# Patient Record
(demographics unavailable — no encounter records)

---

## 2025-03-28 NOTE — HISTORY OF PRESENT ILLNESS
[de-identified] : Patient presents for followup -om prozac for anxiety and depression -GERD, on omeprazole -Hyperlipidemia, trying to improve dietarily -Struggling with weight, weight loss injectables not covered by insurance

## 2025-03-28 NOTE — ASSESSMENT
[FreeTextEntry1] : Venipuncture done in our office, Labs sent out.Patient advised to continue present medications with diet/exercise and specialists followup. Patient will return to the office for CP in 4months   mammogram 2/2024 "to do" Echo 3/2021 Specialists include 1. GYN=to see Dr. Wren in near future 2. Spine specialist- Dr. Garcia 3. Cardiology-Dr. Gasca 4.Pulmonary-Dr. Marquez 5. Bariatric-Dr. leger 6. GI-Dr. Morillo 7.therapist + vaccines are UTD, +got covid vaccines  Colonoscopy 1/2023 with follow-up in 10 years

## 2025-03-28 NOTE — HISTORY OF PRESENT ILLNESS
[de-identified] : Patient presents for followup -om prozac for anxiety and depression -GERD, on omeprazole -Hyperlipidemia, trying to improve dietarily -Struggling with weight, weight loss injectables not covered by insurance

## 2025-05-10 NOTE — HISTORY OF PRESENT ILLNESS
[de-identified] : Patient presents for followup -GERD, on omeprazole -Hyperlipidemia, trying to improve dietarily - On semaglutide for obesity, highest weight was 283 and now down to 247 -Currently not fasting -Doing well off of Prozac -Started HRT

## 2025-05-10 NOTE — HISTORY OF PRESENT ILLNESS
[de-identified] : Patient presents for followup -GERD, on omeprazole -Hyperlipidemia, trying to improve dietarily - On semaglutide for obesity, highest weight was 283 and now down to 247 -Currently not fasting -Doing well off of Prozac -Started HRT

## 2025-05-10 NOTE — ASSESSMENT
[FreeTextEntry1] : Venipuncture done in our office, Labs sent out.Patient advised to continue present medications with diet/exercise and specialists followup. Patient will return to the office for CP in 4months   mammogram 2/2024 "to do"= referral given Echo 3/2021 Specialists include 1. GYN= Dr. Wren 2. Spine specialist- Dr. Garcia 3. Cardiology-Dr. Gasca 4.Pulmonary-Dr. Marquez 5. Bariatric-Dr. leger 6. GI-Dr. Morillo 7.therapist + 8.[facility] for rx vaccines are UTD, +got covid vaccines  Colonoscopy 1/2023 with follow-up in 10 years

## 2025-05-10 NOTE — ADDENDUM
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

## 2025-05-10 NOTE — HISTORY OF PRESENT ILLNESS
[de-identified] : Patient presents for followup -GERD, on omeprazole -Hyperlipidemia, trying to improve dietarily - On semaglutide for obesity, highest weight was 283 and now down to 247 -Currently not fasting -Doing well off of Prozac -Started HRT

## 2025-06-17 NOTE — COUNSELING
[Potential consequences of obesity discussed] : Potential consequences of obesity discussed [Benefits of weight loss discussed] : Benefits of weight loss discussed [Structured Weight Management Program suggested:] : Structured weight management program suggested [Encouraged to increase physical activity] : Encouraged to increase physical activity [None] : None [Needs reinforcement, provided] : Patient needs reinforcement on understanding of lifestyle changes and steps needed to achieve self management goal; reinforcement was provided

## 2025-06-17 NOTE — ASSESSMENT
[Vaccines Reviewed] : Immunizations reviewed today. Please see immunization details in the vaccine log within the immunization flowsheet.  Oriented to time, place, person, situation

## 2025-06-18 NOTE — HEALTH RISK ASSESSMENT
[Good] : ~his/her~ current health as good [Never (0 pts)] : Never (0 points) [No] : In the past 12 months have you used drugs other than those required for medical reasons? No [No falls in past year] : Patient reported no falls in the past year [0] : 2) Feeling down, depressed, or hopeless: Not at all (0) [PHQ-2 Negative - No further assessment needed] : PHQ-2 Negative - No further assessment needed [Former] : Former [Patient reported mammogram was normal] : Patient reported mammogram was normal [Patient reported PAP Smear was normal] : Patient reported PAP Smear was normal [With Significant Other] : lives with significant other [# of Members in Household ___] :  household currently consist of [unfilled] member(s) [Employed] : employed [College] : College [] :  [# Of Children ___] : has [unfilled] children [Feels Safe at Home] : Feels safe at home [Fully functional (bathing, dressing, toileting, transferring, walking, feeding)] : Fully functional (bathing, dressing, toileting, transferring, walking, feeding) [Fully functional (using the telephone, shopping, preparing meals, housekeeping, doing laundry, using] : Fully functional and needs no help or supervision to perform IADLs (using the telephone, shopping, preparing meals, housekeeping, doing laundry, using transportation, managing medications and managing finances) [Smoke Detector] : smoke detector [Carbon Monoxide Detector] : carbon monoxide detector [Seat Belt] :  uses seat belt [Sunscreen] : uses sunscreen [Reviewed no changes] : Reviewed, no changes [Designated Healthcare Proxy] : Designated healthcare proxy [Name: ___] : Health Care Proxy's Name: [unfilled]  [Very Good] : ~his/her~  mood as very good [2 - 3 times a week (3 pts)] : 2 - 3  times a week (3 points) [3 or 4 (1 pt)] : 3 or 4  (1 point) [None] : Patient does not have any barriers to medication adherence [Yes] : Reviewed medication list for presence of high-risk medications. [NO] : No

## 2025-06-24 NOTE — HISTORY OF PRESENT ILLNESS
[Home] : at home, [unfilled] , at the time of the visit. [Medical Office: ([facility])___] : at the medical office located in  [Telephone (audio)] : This telephonic visit was provided via audio only technology. [Other:___] : RILEY [Verbal consent obtained from patient] : the patient, [unfilled] [FreeTextEntry8] : Patient engaged in telephone visit in lieu of office visit to discuss her anxiety and depression Would like to restart fluoxetine, has been off, took in the past with benefit and no side effects Patient is not suicidal Patient anticipates starting therapy sessions as well   Diagnosis code =F41.9/F32.A Time on phone =5minutes

## 2025-06-24 NOTE — ASSESSMENT
[FreeTextEntry1] : Start fluoxetine 10 mg daily, to pursue therapy/counseling.  Patient will call with any side effects or issues.  Return to the office as scheduled for follow-up  Dr. Bunch was present in office building while I examined patient